# Patient Record
Sex: FEMALE | Race: WHITE | NOT HISPANIC OR LATINO | Employment: OTHER | URBAN - NONMETROPOLITAN AREA
[De-identification: names, ages, dates, MRNs, and addresses within clinical notes are randomized per-mention and may not be internally consistent; named-entity substitution may affect disease eponyms.]

---

## 2018-11-30 ENCOUNTER — TELEPHONE (OUTPATIENT)
Dept: OTOLARYNGOLOGY | Facility: CLINIC | Age: 53
End: 2018-11-30

## 2018-11-30 ENCOUNTER — OFFICE VISIT (OUTPATIENT)
Dept: OTOLARYNGOLOGY | Facility: CLINIC | Age: 53
End: 2018-11-30
Payer: COMMERCIAL

## 2018-11-30 VITALS — HEART RATE: 75 BPM | DIASTOLIC BLOOD PRESSURE: 70 MMHG | HEIGHT: 63 IN | SYSTOLIC BLOOD PRESSURE: 110 MMHG

## 2018-11-30 DIAGNOSIS — R09.81 NASAL CONGESTION: Primary | ICD-10-CM

## 2018-11-30 DIAGNOSIS — G43.009 MIGRAINE WITHOUT AURA AND WITHOUT STATUS MIGRAINOSUS, NOT INTRACTABLE: ICD-10-CM

## 2018-11-30 DIAGNOSIS — R09.82 POST-NASAL DRIP: ICD-10-CM

## 2018-11-30 DIAGNOSIS — R44.8 FACIAL PRESSURE: ICD-10-CM

## 2018-11-30 DIAGNOSIS — R42 DIZZINESS: ICD-10-CM

## 2018-11-30 PROCEDURE — 31231 NASAL ENDOSCOPY DX: CPT | Performed by: OTOLARYNGOLOGY

## 2018-11-30 PROCEDURE — 99243 OFF/OP CNSLTJ NEW/EST LOW 30: CPT | Performed by: OTOLARYNGOLOGY

## 2018-11-30 PROCEDURE — 69210 REMOVE IMPACTED EAR WAX UNI: CPT | Performed by: OTOLARYNGOLOGY

## 2018-11-30 RX ORDER — CLOBETASOL PROPIONATE 0.5 MG/G
CREAM TOPICAL
COMMUNITY
Start: 2016-12-16

## 2018-11-30 RX ORDER — IBUPROFEN 600 MG/1
TABLET ORAL
COMMUNITY
Start: 2016-12-16

## 2018-11-30 NOTE — LETTER
November 30, 2018     [address]    Patient: Jorge Alberto Bradshaw   YOB: 1965   Date of Visit: 11/30/2018       Dear Dr Terri Woodruff: Thank you for referring Jorge Alberto Bradshaw to me for evaluation  Below are my notes for this consultation  If you have questions, please do not hesitate to call me  I look forward to following your patient along with you  Sincerely,        Mauro Hernandez MD        CC: No Recipients  Mauro Hernandez MD  11/30/2018 12:22 PM  Sign at close encounter  Consultation - Otolaryngology - Head and Neck Surgery  Facial Plastic and Reconstructive Surgery  Jorge Alberto Bradshaw 52 y o  female MRN: [id number]  Encounter: [id number]        Assessment/Plan:  1  Nasal congestion  CT sinus wo contrast   2  Facial pressure  CT sinus wo contrast   3  Migraine without aura and without status migrainosus, not intractable     4  Dizziness     5  Post-nasal drip         Chronic sinusitis: We discussed the nature chronic sinusitis  She will continue the nasal steroids  As she has already had a 6 month trial of steroids and antibiotics, we will obtain a CT scan after therapy and have the patient return in 1 month for re-evaluation  Dizziness and migraines: Discussed possibility of vestibular migraines as a diagnosis for her dizziness with accompanying VALDEZ  Will discuss this further after CT scan and at follow up  History of Present Illness   Physician Requesting Consult: NURA Moreno  Reason for Consult / Principal Problem: facial pressure and dizziness  HPI: Jorge Alberto Bradshaw is a 52y o  year old female who presents with facial pressure, nasal congestion, history of migraines, and intermittent dizziness  She describes intermittent dizziness as closest to presyncope  She describes a lightheaded sensation when she will rise from a seated or lying position    She notes that frequently her symptoms are different than this with occasional slight spinning sensation  She notes that shortly after the spinning she will have a headache or she may have a headache 1st and then have a sensation of spinning  She notes majority of her dizziness symptoms are associated temporally with headaches  She uses ibuprofen to treat her headaches  No recent head or neck imaging  She has been given multiple rounds of antibiotics as well as nasal steroids  She recently finished a course of oral antibiotics and steroids without any resolution of her symptoms  She continues to use Nasacort nasal spray  No anterior rhinorrhea  She does note postnasal drip  Review of systems:  10 Point ROS was performed and negative except as above or otherwise noted in the medical record  Historical Information   No past medical history on file  No past surgical history on file  Social History   History   Alcohol use Not on file     History   Drug use: Unknown     History   Smoking Status    Not on file   Smokeless Tobacco    Not on file     Family History: No family history on file  No current outpatient prescriptions on file prior to visit  No current facility-administered medications on file prior to visit  No Known Allergies    Vitals:    11/30/18 1138   BP: 110/70   Pulse: 75       Physical Exam   Constitutional: Oriented to person, place, and time  Well-developed and well-nourished, no apparent distress, non-toxic appearance  Cooperative, able to hear and answer questions without difficulty  Voice: Normal voice quality  Head: Normocephalic, atraumatic  No scars, masses or lesions  Face: Symmetric, no edema, no sinus tenderness  Eyes: Vision grossly intact, extra-ocular movement intact  Ears: External ears normal  Bilateral cerumen impaction  After debridement following was noted: Right retained PE tube in the TM otherwise tympanic membranes intact with intact normal landmarks  No post-auricular erythema or tenderness    Nose: Septum intact, nares clear  Mucosa moist, turbinates well appearing  No crusting, polyps or discharge evident  Oral cavity: Dentition intact  Mucosa moist, lips without lesions or masses  Tongue mobile, floor of mouth soft and flat  Hard palate intact  No masses or lesions  Oropharynx: Uvula is midline, soft palate intact without lesion or mass  Oropharyngeal inlet without obstruction  Tonsils unremarkable  Posterior pharyngeal wall clear  No masses or lesions  Salivary glands:  Parotid glands and submandibular glands symmetric, no enlargement or tenderness  Neck: Normal laryngeal elevation with swallow  Trachea midline  No masses or lesions  No palpable adenopathy  Thyroid: Without tenderness or palpable nodules  Pulmonary/Chest: Normal effort and rate  No respiratory distress  No stertor or stridor  Musculoskeletal: Normal range of motion  Neurological: Cranial nerves 2-12 intact  Skin: Skin is warm and dry  Psychiatric: Normal mood and affect  Procedure: Nasal endoscopy    Indications: Evaluate posterior nasal cavity for active source of bleeding  Procedure in detail: After informed verbal consent was obtained the nose was anesthetized using 4% lidocaine and neosynephrine spray  After adequate time for anesthesia the scope was guided easily along the nasal cavity bilaterally  Bilateral middle meatus and sphenoethmoid recesses were evaluated  The scope was removed without difficulty and the patient tolerated the procedure well  Findings: No mucopurulence or polyposis bilaterally  Procedure: Cerumen debridement bilaterally    Indications: Cerumen impaction bilat    Procedure in detail: After informed verbal consent was obtained the ear was visualized using microscopy  Using cerumen loop, suction, and alligator forceps the cerumen was debrided and the findings below were seen  The patient tolerated the procedure well  FINDINGS:  Intact TM with intact normal landmarks   There is a right retained PE tube from 4 years ago per her report  Imaging Studies: None available    Lab Results: None available  Greater than 40 minutes were spent in consultation  More than 1/2 of that time was spent in counselling and coordination of care

## 2018-11-30 NOTE — TELEPHONE ENCOUNTER
CALLED PATIENT AT 7:45 AND 9:15 TO TELL HER TIME OF HER APPOINTMENT FOR TODAY BUT HER VOICE MAIL BOX IS FULL AND COULD NOT LEAVE A MESSAGE AND PATIENT DIDN'T ANSWER PHONE

## 2018-11-30 NOTE — PROGRESS NOTES
Review of Systems   Constitutional: Negative  HENT: Positive for postnasal drip  Eyes: Positive for discharge  Respiratory: Positive for cough  Cardiovascular: Negative  Gastrointestinal: Negative  Endocrine: Negative  Genitourinary: Positive for frequency  Musculoskeletal: Negative  Skin: Negative  Allergic/Immunologic: Negative  Neurological: Positive for dizziness, syncope and light-headedness  Hematological: Bruises/bleeds easily  Psychiatric/Behavioral: Negative

## 2018-11-30 NOTE — PROGRESS NOTES
Consultation - Otolaryngology - Head and Neck Surgery  Facial Plastic and Reconstructive Surgery  Florecita Bynum 52 y o  female MRN: [id number]  Encounter: [id number]        Assessment/Plan:  1  Nasal congestion  CT sinus wo contrast   2  Facial pressure  CT sinus wo contrast   3  Migraine without aura and without status migrainosus, not intractable     4  Dizziness     5  Post-nasal drip         Chronic sinusitis: We discussed the nature chronic sinusitis  She will continue the nasal steroids  As she has already had a 6 month trial of steroids and antibiotics, we will obtain a CT scan after therapy and have the patient return in 1 month for re-evaluation  Dizziness and migraines: Discussed possibility of vestibular migraines as a diagnosis for her dizziness with accompanying VALDEZ  Will discuss this further after CT scan and at follow up  History of Present Illness   Physician Requesting Consult: NURA Michelle  Reason for Consult / Principal Problem: facial pressure and dizziness  HPI: Florecita Bynum is a 52y o  year old female who presents with facial pressure, nasal congestion, history of migraines, and intermittent dizziness  She describes intermittent dizziness as closest to presyncope  She describes a lightheaded sensation when she will rise from a seated or lying position  She notes that frequently her symptoms are different than this with occasional slight spinning sensation  She notes that shortly after the spinning she will have a headache or she may have a headache 1st and then have a sensation of spinning  She notes majority of her dizziness symptoms are associated temporally with headaches  She uses ibuprofen to treat her headaches  No recent head or neck imaging  She has been given multiple rounds of antibiotics as well as nasal steroids  She recently finished a course of oral antibiotics and steroids without any resolution of her symptoms    She continues to use Nasacort nasal spray  No anterior rhinorrhea  She does note postnasal drip  Review of systems:  10 Point ROS was performed and negative except as above or otherwise noted in the medical record  Historical Information   No past medical history on file  No past surgical history on file  Social History   History   Alcohol use Not on file     History   Drug use: Unknown     History   Smoking Status    Not on file   Smokeless Tobacco    Not on file     Family History: No family history on file  No current outpatient prescriptions on file prior to visit  No current facility-administered medications on file prior to visit  No Known Allergies    Vitals:    11/30/18 1138   BP: 110/70   Pulse: 75       Physical Exam   Constitutional: Oriented to person, place, and time  Well-developed and well-nourished, no apparent distress, non-toxic appearance  Cooperative, able to hear and answer questions without difficulty  Voice: Normal voice quality  Head: Normocephalic, atraumatic  No scars, masses or lesions  Face: Symmetric, no edema, no sinus tenderness  Eyes: Vision grossly intact, extra-ocular movement intact  Ears: External ears normal  Bilateral cerumen impaction  After debridement following was noted: Right retained PE tube in the TM otherwise tympanic membranes intact with intact normal landmarks  No post-auricular erythema or tenderness  Nose: Septum intact, nares clear  Mucosa moist, turbinates well appearing  No crusting, polyps or discharge evident  Oral cavity: Dentition intact  Mucosa moist, lips without lesions or masses  Tongue mobile, floor of mouth soft and flat  Hard palate intact  No masses or lesions  Oropharynx: Uvula is midline, soft palate intact without lesion or mass  Oropharyngeal inlet without obstruction  Tonsils unremarkable  Posterior pharyngeal wall clear  No masses or lesions    Salivary glands:  Parotid glands and submandibular glands symmetric, no enlargement or tenderness  Neck: Normal laryngeal elevation with swallow  Trachea midline  No masses or lesions  No palpable adenopathy  Thyroid: Without tenderness or palpable nodules  Pulmonary/Chest: Normal effort and rate  No respiratory distress  No stertor or stridor  Musculoskeletal: Normal range of motion  Neurological: Cranial nerves 2-12 intact  Skin: Skin is warm and dry  Psychiatric: Normal mood and affect  Procedure: Nasal endoscopy    Indications: Evaluate posterior nasal cavity for active source of bleeding  Procedure in detail: After informed verbal consent was obtained the nose was anesthetized using 4% lidocaine and neosynephrine spray  After adequate time for anesthesia the scope was guided easily along the nasal cavity bilaterally  Bilateral middle meatus and sphenoethmoid recesses were evaluated  The scope was removed without difficulty and the patient tolerated the procedure well  Findings: No mucopurulence or polyposis bilaterally  Procedure: Cerumen debridement bilaterally    Indications: Cerumen impaction bilat    Procedure in detail: After informed verbal consent was obtained the ear was visualized using microscopy  Using cerumen loop, suction, and alligator forceps the cerumen was debrided and the findings below were seen  The patient tolerated the procedure well  FINDINGS:  Intact TM with intact normal landmarks  There is a right retained PE tube from 4 years ago per her report  Imaging Studies: None available    Lab Results: None available  Greater than 40 minutes were spent in consultation  More than 1/2 of that time was spent in counselling and coordination of care

## 2018-11-30 NOTE — LETTER
November 30, 2018     [address]    Patient: Iker Faye   YOB: 1965   Date of Visit: 11/30/2018       Dear Dr Nathalia Bonilla: Thank you for referring Iker Faey to me for evaluation  Below are my notes for this consultation  If you have questions, please do not hesitate to call me  I look forward to following your patient along with you  Sincerely,        Wolf Hall MD        CC: No Recipients  Wolf Hall MD  11/30/2018 12:21 PM  Sign at close encounter  Consultation - Otolaryngology - Head and Neck Surgery  Facial Plastic and Reconstructive Surgery  Iker Faye 52 y o  female MRN: [id number]  Encounter: [id number]        Assessment/Plan:  1  Nasal congestion  CT sinus wo contrast   2  Facial pressure  CT sinus wo contrast   3  Migraine without aura and without status migrainosus, not intractable     4  Dizziness     5  Post-nasal drip         Chronic sinusitis: We discussed the nature chronic sinusitis  She will continue the nasal steroids  As she has already had a 6 month trial of steroids and antibiotics, we will obtain a CT scan after therapy and have the patient return in 1 month for re-evaluation  Dizziness and migraines: Discussed possibility of vestibular migraines as a diagnosis for her dizziness with accompanying VALDEZ  Will discuss this further after CT scan and at follow up  History of Present Illness   Physician Requesting Consult: NURA Montero  Reason for Consult / Principal Problem: facial pressure and dizziness  HPI: Iker Faye is a 52y o  year old female who presents with facial pressure, nasal congestion, history of migraines, and intermittent dizziness  She describes intermittent dizziness as closest to presyncope  She describes a lightheaded sensation when she will rise from a seated or lying position    She notes that frequently her symptoms are different than this with occasional slight spinning sensation  She notes that shortly after the spinning she will have a headache or she may have a headache 1st and then have a sensation of spinning  She notes majority of her dizziness symptoms are associated temporally with headaches  She uses ibuprofen to treat her headaches  No recent head or neck imaging  She has been given multiple rounds of antibiotics as well as nasal steroids  She recently finished a course of oral antibiotics and steroids without any resolution of her symptoms  She continues to use Nasacort nasal spray  No anterior rhinorrhea  She does note postnasal drip  Review of systems:  10 Point ROS was performed and negative except as above or otherwise noted in the medical record  Historical Information   No past medical history on file  No past surgical history on file  Social History   History   Alcohol use Not on file     History   Drug use: Unknown     History   Smoking Status    Not on file   Smokeless Tobacco    Not on file     Family History: No family history on file  No current outpatient prescriptions on file prior to visit  No current facility-administered medications on file prior to visit  No Known Allergies    Vitals:    11/30/18 1138   BP: 110/70   Pulse: 75       Physical Exam   Constitutional: Oriented to person, place, and time  Well-developed and well-nourished, no apparent distress, non-toxic appearance  Cooperative, able to hear and answer questions without difficulty  Voice: Normal voice quality  Head: Normocephalic, atraumatic  No scars, masses or lesions  Face: Symmetric, no edema, no sinus tenderness  Eyes: Vision grossly intact, extra-ocular movement intact  Ears: External ears normal  Bilateral cerumen impaction  After debridement following was noted: Right retained PE tube in the TM otherwise tympanic membranes intact with intact normal landmarks  No post-auricular erythema or tenderness    Nose: Septum intact, nares clear  Mucosa moist, turbinates well appearing  No crusting, polyps or discharge evident  Oral cavity: Dentition intact  Mucosa moist, lips without lesions or masses  Tongue mobile, floor of mouth soft and flat  Hard palate intact  No masses or lesions  Oropharynx: Uvula is midline, soft palate intact without lesion or mass  Oropharyngeal inlet without obstruction  Tonsils unremarkable  Posterior pharyngeal wall clear  No masses or lesions  Salivary glands:  Parotid glands and submandibular glands symmetric, no enlargement or tenderness  Neck: Normal laryngeal elevation with swallow  Trachea midline  No masses or lesions  No palpable adenopathy  Thyroid: Without tenderness or palpable nodules  Pulmonary/Chest: Normal effort and rate  No respiratory distress  No stertor or stridor  Musculoskeletal: Normal range of motion  Neurological: Cranial nerves 2-12 intact  Skin: Skin is warm and dry  Psychiatric: Normal mood and affect  Procedure: Nasal endoscopy    Indications: Evaluate posterior nasal cavity for active source of bleeding  Procedure in detail: After informed verbal consent was obtained the nose was anesthetized using 4% lidocaine and neosynephrine spray  After adequate time for anesthesia the scope was guided easily along the nasal cavity bilaterally  Bilateral middle meatus and sphenoethmoid recesses were evaluated  The scope was removed without difficulty and the patient tolerated the procedure well  Findings: No mucopurulence or polyposis bilaterally  Imaging Studies: None available    Lab Results: None available  Greater than 40 minutes were spent in consultation  More than 1/2 of that time was spent in counselling and coordination of care

## 2018-12-12 ENCOUNTER — HOSPITAL ENCOUNTER (OUTPATIENT)
Dept: CT IMAGING | Facility: HOSPITAL | Age: 53
Discharge: HOME | End: 2018-12-12
Attending: OTOLARYNGOLOGY
Payer: COMMERCIAL

## 2018-12-12 DIAGNOSIS — R09.81 NASAL CONGESTION: ICD-10-CM

## 2018-12-12 DIAGNOSIS — R44.8 FACIAL PRESSURE: ICD-10-CM

## 2018-12-12 PROCEDURE — 70486 CT MAXILLOFACIAL W/O DYE: CPT

## 2018-12-14 ENCOUNTER — TELEPHONE (OUTPATIENT)
Dept: OTOLARYNGOLOGY | Facility: CLINIC | Age: 53
End: 2018-12-14

## 2018-12-14 NOTE — TELEPHONE ENCOUNTER
PATIENT CALLED RE ANTIBIOTIC SHE WAS SUPPOSED TO HAVE ORDERED BY DOCTOR  MESSAGE WAS SENT TO DOCTOR

## 2019-01-18 ENCOUNTER — OFFICE VISIT (OUTPATIENT)
Dept: OTOLARYNGOLOGY | Facility: CLINIC | Age: 54
End: 2019-01-18
Payer: COMMERCIAL

## 2019-01-18 VITALS — HEIGHT: 63 IN | DIASTOLIC BLOOD PRESSURE: 80 MMHG | SYSTOLIC BLOOD PRESSURE: 122 MMHG

## 2019-01-18 DIAGNOSIS — R44.8 FACIAL PRESSURE: ICD-10-CM

## 2019-01-18 DIAGNOSIS — J32.4 CHRONIC PANSINUSITIS: Primary | ICD-10-CM

## 2019-01-18 DIAGNOSIS — R09.81 NASAL CONGESTION: ICD-10-CM

## 2019-01-18 PROCEDURE — 31231 NASAL ENDOSCOPY DX: CPT | Performed by: OTOLARYNGOLOGY

## 2019-01-18 PROCEDURE — 99213 OFFICE O/P EST LOW 20 MIN: CPT | Performed by: OTOLARYNGOLOGY

## 2019-01-18 NOTE — LETTER
January 18, 2019     [address], [address]    Patient: Charla Dubin   YOB: 1965   Date of Visit: 1/18/2019       Dear Dr Jacey Ann: Thank you for referring Charla Dubin to me for evaluation  Below are my notes for this consultation  If you have questions, please do not hesitate to call me  I look forward to following your patient along with you  Sincerely,        Rylie Cabral MD        CC: No Recipients  Rylie Cabral MD  1/18/2019  4:05 PM  Sign at close encounter  Charla Dubin is a 52 y  o female who presents for re-evaluation of facial pain, nasal congestion, and facial pressure  She underwent CT scan which demonstrated bilateral chronic pansinusitis  She denies any nasal drainage  She notes hyposmia  She feels that the oral steroids, nasal steroids, and antibiotics did not substantially improve things  No past medical history on file  /80 (BP Location: Left arm, Patient Position: Sitting, Cuff Size: Large)   Ht 5' 3" (1 6 m)       Physical Exam   Constitutional: Oriented to person, place, and time  Well-developed and well-nourished, no apparent distress, non-toxic appearance  Cooperative, able to hear and answer questions without difficulty  Voice: Normal voice quality  Head: Normocephalic, atraumatic  No scars, masses or lesions  Face: Symmetric, no edema, no sinus tenderness  Eyes: Vision grossly intact, extra-ocular movement intact  Right Ear: External ear normal   Auditory canal clear  No post-auricular erythema or tenderness  Left Ear: External ear normal   Auditory canal clear  No post-auricular erythema or tenderness  Nose: Septum midline, nares clear  Mucosa moist, turbinates well appearing  No crusting, polyps or discharge evident  Oral cavity: Dentition intact  Mucosa moist, lips normal   Tongue mobile, floor of mouth normal   Hard palate unremarkable  No masses or lesions     Oropharynx: Uvula is midline, soft palate normal   Unremarkable oropharyngeal inlet  Tonsils unremarkable  Posterior pharyngeal wall clear  No masses or lesions  Salivary glands:  Parotid glands and submandibular glands symmetric, no enlargement or tenderness  Neck: Normal laryngeal elevation with swallow  Trachea midline  No masses or lesions  No palpable adenopathy  Thyroid: normal in size, unremarkable without tenderness or palpable nodules  Pulmonary/Chest: Normal effort and rate  No respiratory distress  Musculoskeletal: Normal range of motion  Neurological: Cranial nerves 2-12 intact  Skin: Skin is warm and dry  Psychiatric: Normal mood and affect  Procedure: Nasal endoscopy    Indications: Evaluate posterior nasal cavity for active source of bleeding  Procedure in detail: After informed verbal consent was obtained the nose was anesthetized using 4% lidocaine and neosynephrine spray  After adequate time for anesthesia the scope was guided easily along the nasal cavity bilaterally  Bilateral middle meatus and sphenoethmoid recesses were evaluated  The scope was removed without difficulty and the patient tolerated the procedure well  Findings:  Bilaterally no significant mucoid purulence or polyposis  Slight left septal deviation  Not completely obstructive  A/P: Chronic sinusitis:  We discussed risks, benefits, and alternatives to functional endoscopic sinus surgery  We discussed risks including bleeding, infection, and scarring  We discussed risks of hyposmia, injury to the eyes, vision loss, diplopia, injury to the brain, leakage of cerebrospinal fluid, and infection of the brain, among others  He elected to move forward with sinus surgery  We discussed possible need for septoplasty and turbinate reduction for access  We discussed that he will need to irrigate his sinuses afterwards  We discussed follow up 1 week after surgery  He will speak with our office to schedule surgery

## 2019-01-18 NOTE — PROGRESS NOTES
Review of Systems   Constitutional: Negative  HENT: Positive for facial swelling, sinus pain and sinus pressure  Eyes: Negative  Respiratory: Negative  Cardiovascular: Negative  Gastrointestinal: Negative  Endocrine: Negative  Genitourinary: Negative  Musculoskeletal: Negative  Skin: Negative  Allergic/Immunologic: Negative  Neurological: Negative  Hematological: Negative  Psychiatric/Behavioral: Negative

## 2019-01-18 NOTE — PROGRESS NOTES
Amanda Olson is a 52 y  o female who presents for re-evaluation of facial pain, nasal congestion, and facial pressure  She underwent CT scan which demonstrated bilateral chronic pansinusitis  She denies any nasal drainage  She notes hyposmia  She feels that the oral steroids, nasal steroids, and antibiotics did not substantially improve things  No past medical history on file  /80 (BP Location: Left arm, Patient Position: Sitting, Cuff Size: Large)   Ht 5' 3" (1 6 m)       Physical Exam   Constitutional: Oriented to person, place, and time  Well-developed and well-nourished, no apparent distress, non-toxic appearance  Cooperative, able to hear and answer questions without difficulty  Voice: Normal voice quality  Head: Normocephalic, atraumatic  No scars, masses or lesions  Face: Symmetric, no edema, no sinus tenderness  Eyes: Vision grossly intact, extra-ocular movement intact  Right Ear: External ear normal   Auditory canal clear  No post-auricular erythema or tenderness  Left Ear: External ear normal   Auditory canal clear  No post-auricular erythema or tenderness  Nose: Septum midline, nares clear  Mucosa moist, turbinates well appearing  No crusting, polyps or discharge evident  Oral cavity: Dentition intact  Mucosa moist, lips normal   Tongue mobile, floor of mouth normal   Hard palate unremarkable  No masses or lesions  Oropharynx: Uvula is midline, soft palate normal   Unremarkable oropharyngeal inlet  Tonsils unremarkable  Posterior pharyngeal wall clear  No masses or lesions  Salivary glands:  Parotid glands and submandibular glands symmetric, no enlargement or tenderness  Neck: Normal laryngeal elevation with swallow  Trachea midline  No masses or lesions  No palpable adenopathy  Thyroid: normal in size, unremarkable without tenderness or palpable nodules  Pulmonary/Chest: Normal effort and rate  No respiratory distress     Musculoskeletal: Normal range of motion  Neurological: Cranial nerves 2-12 intact  Skin: Skin is warm and dry  Psychiatric: Normal mood and affect  Procedure: Nasal endoscopy    Indications: Evaluate posterior nasal cavity for active source of bleeding  Procedure in detail: After informed verbal consent was obtained the nose was anesthetized using 4% lidocaine and neosynephrine spray  After adequate time for anesthesia the scope was guided easily along the nasal cavity bilaterally  Bilateral middle meatus and sphenoethmoid recesses were evaluated  The scope was removed without difficulty and the patient tolerated the procedure well  Findings:  Bilaterally no significant mucoid purulence or polyposis  Slight left septal deviation  Not completely obstructive  A/P: Chronic sinusitis:  We discussed risks, benefits, and alternatives to functional endoscopic sinus surgery  We discussed risks including bleeding, infection, and scarring  We discussed risks of hyposmia, injury to the eyes, vision loss, diplopia, injury to the brain, leakage of cerebrospinal fluid, and infection of the brain, among others  He elected to move forward with sinus surgery  We discussed possible need for septoplasty and turbinate reduction for access  We discussed that he will need to irrigate his sinuses afterwards  We discussed follow up 1 week after surgery  He will speak with our office to schedule surgery

## 2019-02-21 ENCOUNTER — TRANSCRIBE ORDERS (OUTPATIENT)
Dept: ADMINISTRATIVE | Facility: HOSPITAL | Age: 54
End: 2019-02-21

## 2019-02-21 DIAGNOSIS — R42 DIZZINESS: Primary | ICD-10-CM

## 2019-02-21 DIAGNOSIS — R41.3 MEMORY LOSS: ICD-10-CM

## 2019-02-28 ENCOUNTER — HOSPITAL ENCOUNTER (OUTPATIENT)
Dept: MRI IMAGING | Facility: HOSPITAL | Age: 54
Discharge: HOME | End: 2019-02-28
Payer: COMMERCIAL

## 2019-02-28 DIAGNOSIS — R41.3 MEMORY LOSS: ICD-10-CM

## 2019-02-28 DIAGNOSIS — R42 DIZZINESS: ICD-10-CM

## 2019-02-28 PROCEDURE — 70551 MRI BRAIN STEM W/O DYE: CPT

## 2019-10-09 ENCOUNTER — TELEPHONE (OUTPATIENT)
Dept: OTOLARYNGOLOGY | Facility: CLINIC | Age: 54
End: 2019-10-09

## 2019-10-09 NOTE — TELEPHONE ENCOUNTER
TRIED CALLING MERRY NOW FOR THE 2ND TIME AND MAIL BOX IS FULL    MERRY CALLED BACK AND SHE IS SCHEDULED FOR 10/18/19

## 2019-10-18 ENCOUNTER — OFFICE VISIT (OUTPATIENT)
Dept: OTOLARYNGOLOGY | Facility: CLINIC | Age: 54
End: 2019-10-18
Payer: COMMERCIAL

## 2019-10-18 VITALS — HEIGHT: 63 IN | HEART RATE: 84 BPM | SYSTOLIC BLOOD PRESSURE: 130 MMHG | DIASTOLIC BLOOD PRESSURE: 70 MMHG

## 2019-10-18 DIAGNOSIS — R09.81 NASAL CONGESTION: ICD-10-CM

## 2019-10-18 DIAGNOSIS — J32.4 CHRONIC PANSINUSITIS: Primary | ICD-10-CM

## 2019-10-18 DIAGNOSIS — R44.8 FACIAL PRESSURE: ICD-10-CM

## 2019-10-18 DIAGNOSIS — H61.23 HEARING LOSS OF BOTH EARS DUE TO CERUMEN IMPACTION: ICD-10-CM

## 2019-10-18 PROCEDURE — 31231 NASAL ENDOSCOPY DX: CPT | Performed by: OTOLARYNGOLOGY

## 2019-10-18 PROCEDURE — 99213 OFFICE O/P EST LOW 20 MIN: CPT | Performed by: OTOLARYNGOLOGY

## 2019-10-18 PROCEDURE — 69210 REMOVE IMPACTED EAR WAX UNI: CPT | Performed by: OTOLARYNGOLOGY

## 2019-10-18 RX ORDER — PREDNISONE 10 MG/1
TABLET ORAL
Qty: 30 TABLET | Refills: 0 | Status: ON HOLD | OUTPATIENT
Start: 2019-10-18 | End: 2019-12-12

## 2019-10-18 RX ORDER — AMOXICILLIN AND CLAVULANATE POTASSIUM 875; 125 MG/1; MG/1
1 TABLET, FILM COATED ORAL EVERY 12 HOURS SCHEDULED
Qty: 14 TABLET | Refills: 0 | Status: SHIPPED | OUTPATIENT
Start: 2019-10-18 | End: 2019-10-25

## 2019-10-18 NOTE — PROGRESS NOTES
Review of Systems   Constitutional: Negative  HENT: Positive for congestion, postnasal drip, rhinorrhea and sinus pressure  Eyes: Negative  Respiratory: Negative  Cardiovascular: Negative  Gastrointestinal: Negative  Endocrine: Negative  Genitourinary: Negative  Musculoskeletal: Negative  Skin: Negative  Allergic/Immunologic: Negative  Neurological: Negative  Hematological: Negative  Psychiatric/Behavioral: Negative

## 2019-10-18 NOTE — PROGRESS NOTES
Eleni Rizzo is a 62 y  o female who presents for re-evaluation of chronic sinusitis  She the was lost to follow-up after her last visit in January as she had significant concerns about surgery  She denies any improvement in her symptoms  She feels that things have actually worsened recently  She notes worsened postnasal drip when she lies down  She has had more problems with dizziness  She notes worsened facial pressure and decreased sense of smell  She had a CT scan performed in December of 2018 which demonstrated chronic pansinusitis  No previous sinus surgery  History reviewed  No pertinent past medical history  /70 (BP Location: Left arm, Patient Position: Sitting, Cuff Size: Large)   Pulse 84   Ht 5' 3" (1 6 m)       Physical Exam   Constitutional: Oriented to person, place, and time  Well-developed and well-nourished, no apparent distress, non-toxic appearance  Cooperative, able to hear and answer questions without difficulty  Voice: Normal voice quality  Head: Normocephalic, atraumatic  No scars, masses or lesions  Face: Symmetric, no edema, no sinus tenderness  Eyes: Vision grossly intact, extra-ocular movement intact  Ears: External ear normal   Bilateral complete cerumen impaction and retained right PE tube in the wax  Bilateral tympanic membranes are intact with intact normal landmarks  No post-auricular erythema or tenderness  Nose: Septum right deviated, nares clear  Mucosa moist, turbinates well appearing  No crusting, polyps or discharge evident  Oral cavity: Dentition intact  Mucosa moist, lips normal   Tongue mobile, floor of mouth normal   Hard palate unremarkable  No masses or lesions  Oropharynx: Uvula is midline, soft palate normal   Unremarkable oropharyngeal inlet  Tonsils unremarkable  Posterior pharyngeal wall clear  No masses or lesions  Salivary glands:  Parotid glands and submandibular glands symmetric, no enlargement or tenderness    Neck: Ventricular Rate : 97  Atrial Rate : 97  P-R Interval : 188  QRS Duration : 110  Q-T Interval : 386  QTC Calculation(Bezet) : 490  P Axis : 39  R Axis : -20  T Axis : 10  Diagnosis : Normal sinus rhythm  Moderate voltage criteria for LVH, may be normal variant  Prolonged QT  ****Abnormal ECG****  When compared with ECG of 26-JAN-2019 01:01,  No significant change was found  Confirmed by GUCCI SY, CONCHIS ([id number]) on 1/27/2019 9:59:58 AM   Normal laryngeal elevation with swallow  Trachea midline  No masses or lesions  No palpable adenopathy  Thyroid: normal in size, unremarkable without tenderness or palpable nodules  Pulmonary/Chest: Normal effort and rate  No respiratory distress  Musculoskeletal: Normal range of motion  Neurological: Cranial nerves 2-12 intact  Skin: Skin is warm and dry  Psychiatric: Normal mood and affect  Procedure:  Nasal endoscopy    Indications:  Evaluation for sinusitis    Procedure in detail:  After informed verbal consent was obtained from the patient bilateral nares were anesthetized with 1% lidocaine and oxymetazoline spray  After adequate time for anesthesia patient's bilateral nasal cavities, middle meatus, and sphenoethmoid recesses were evaluated with the following findings  Patient tolerated the procedure well without complications  Findings:   No significant mucoid purulence or polyposis seen in bilateral nasal cavities  Right septal deviation    Procedure: Cerumen debridement bilateral    Indications: Cerumen impaction bilateral    Procedure in detail: After informed verbal consent was obtained the ear was visualized using microscopy  Using cerumen loop, suction, and alligator forceps the cerumen was debrided and the findings below were seen  The patient tolerated the procedure well  FINDINGS:  Right retained PE tube removed along with the cerumen  A/P: Chronic sinusitis:  We discussed the nature chronic sinusitis  We discussed starting 7 day course of augmentin, 12 day steroid taper, and nasal steroids  We discussed that the use of appropriate medical therapy can substantially improve symptoms, but neither medications or surgery cure the disease and needs ongoing treatment  We discussed the risks of antibiotics, including, but not limited to, incomplete therapy, C diff colitis, medication her reactions, medication allergy, and others    We discussed the risks benefits and alternatives to oral prednisone therapy, including, but not limited to, GI distress in worsening of ulcers, elevation of blood sugar and diabetic complications, psychiatric complications, and avascular necrosis of have  We will obtain a CT scan after therapy and have the patient return in 1 month for re-evaluation  We discussed risks, benefits, and alternatives to functional endoscopic sinus surgery  We discussed risks including bleeding, infection, and scarring  We discussed risks of hyposmia, injury to the eyes, vision loss, diplopia, injury to the brain, leakage of cerebrospinal fluid, and infection of the brain, among others  She elected to move forward with sinus surgery  We discussed possible need for septoplasty and turbinate reduction for access  We discussed that she will need to irrigate her sinuses afterwards  We discussed follow up 1 week after surgery  She will speak with our office to schedule surgery  Will plan for bilateral maxillary antrosotomy, total ethmoidectomy, frontal sinusotomy with balloon sinuplasty, and sphenoidotomy  Will plan for turb reduction and possible septoplasty if necessary  CT scan will be performed as above preop

## 2019-10-30 ENCOUNTER — HOSPITAL ENCOUNTER (OUTPATIENT)
Dept: CT IMAGING | Facility: HOSPITAL | Age: 54
Discharge: HOME | End: 2019-10-30
Attending: OTOLARYNGOLOGY
Payer: COMMERCIAL

## 2019-10-30 DIAGNOSIS — R44.8 FACIAL PRESSURE: ICD-10-CM

## 2019-10-30 DIAGNOSIS — R09.81 NASAL CONGESTION: ICD-10-CM

## 2019-10-30 DIAGNOSIS — J32.4 CHRONIC PANSINUSITIS: ICD-10-CM

## 2019-10-30 PROCEDURE — 70486 CT MAXILLOFACIAL W/O DYE: CPT

## 2019-12-11 ENCOUNTER — ANESTHESIA EVENT (OUTPATIENT)
Dept: PERIOP | Facility: HOSPITAL | Age: 54
End: 2019-12-11
Payer: COMMERCIAL

## 2019-12-12 ENCOUNTER — HOSPITAL ENCOUNTER (OUTPATIENT)
Facility: HOSPITAL | Age: 54
Setting detail: OUTPATIENT SURGERY
Discharge: HOME | End: 2019-12-12
Attending: OTOLARYNGOLOGY | Admitting: OTOLARYNGOLOGY
Payer: COMMERCIAL

## 2019-12-12 ENCOUNTER — ANESTHESIA (OUTPATIENT)
Dept: PERIOP | Facility: HOSPITAL | Age: 54
End: 2019-12-12
Payer: COMMERCIAL

## 2019-12-12 VITALS
RESPIRATION RATE: 16 BRPM | BODY MASS INDEX: 51.91 KG/M2 | OXYGEN SATURATION: 94 % | DIASTOLIC BLOOD PRESSURE: 71 MMHG | SYSTOLIC BLOOD PRESSURE: 152 MMHG | HEART RATE: 76 BPM | WEIGHT: 293 LBS | TEMPERATURE: 97.4 F | HEIGHT: 63 IN

## 2019-12-12 DIAGNOSIS — J32.4 CHRONIC PANSINUSITIS: ICD-10-CM

## 2019-12-12 DIAGNOSIS — R09.81 NASAL CONGESTION: Primary | ICD-10-CM

## 2019-12-12 DIAGNOSIS — R44.8 FACIAL PRESSURE: ICD-10-CM

## 2019-12-12 PROCEDURE — 61782 SCAN PROC CRANIAL EXTRA: CPT | Performed by: OTOLARYNGOLOGY

## 2019-12-12 PROCEDURE — 88312 SPECIAL STAINS GROUP 1: CPT | Performed by: PATHOLOGY

## 2019-12-12 PROCEDURE — 31256 EXPLORATION MAXILLARY SINUS: CPT | Performed by: OTOLARYNGOLOGY

## 2019-12-12 PROCEDURE — 88304 TISSUE EXAM BY PATHOLOGIST: CPT | Performed by: PATHOLOGY

## 2019-12-12 PROCEDURE — 31287 NASAL/SINUS ENDOSCOPY SURG: CPT | Performed by: OTOLARYNGOLOGY

## 2019-12-12 PROCEDURE — 30140 RESECT INFERIOR TURBINATE: CPT | Performed by: OTOLARYNGOLOGY

## 2019-12-12 PROCEDURE — 31253 NSL/SINS NDSC TOTAL: CPT | Performed by: OTOLARYNGOLOGY

## 2019-12-12 RX ORDER — FENTANYL CITRATE 50 UG/ML
INJECTION, SOLUTION INTRAMUSCULAR; INTRAVENOUS AS NEEDED
Status: DISCONTINUED | OUTPATIENT
Start: 2019-12-12 | End: 2019-12-12 | Stop reason: SURG

## 2019-12-12 RX ORDER — ROCURONIUM BROMIDE 10 MG/ML
INJECTION, SOLUTION INTRAVENOUS AS NEEDED
Status: DISCONTINUED | OUTPATIENT
Start: 2019-12-12 | End: 2019-12-12 | Stop reason: SURG

## 2019-12-12 RX ORDER — LIDOCAINE HYDROCHLORIDE AND EPINEPHRINE 10; 10 MG/ML; UG/ML
INJECTION, SOLUTION INFILTRATION; PERINEURAL AS NEEDED
Status: DISCONTINUED | OUTPATIENT
Start: 2019-12-12 | End: 2019-12-12 | Stop reason: HOSPADM

## 2019-12-12 RX ORDER — ONDANSETRON 2 MG/ML
4 INJECTION INTRAMUSCULAR; INTRAVENOUS ONCE AS NEEDED
Status: DISCONTINUED | OUTPATIENT
Start: 2019-12-12 | End: 2019-12-12 | Stop reason: HOSPADM

## 2019-12-12 RX ORDER — LIDOCAINE HYDROCHLORIDE 10 MG/ML
INJECTION, SOLUTION EPIDURAL; INFILTRATION; INTRACAUDAL; PERINEURAL AS NEEDED
Status: DISCONTINUED | OUTPATIENT
Start: 2019-12-12 | End: 2019-12-12 | Stop reason: SURG

## 2019-12-12 RX ORDER — HYDROMORPHONE HCL/PF 1 MG/ML
0.5 SYRINGE (ML) INJECTION
Status: DISCONTINUED | OUTPATIENT
Start: 2019-12-12 | End: 2019-12-12 | Stop reason: HOSPADM

## 2019-12-12 RX ORDER — PROPOFOL 10 MG/ML
INJECTION, EMULSION INTRAVENOUS CONTINUOUS PRN
Status: DISCONTINUED | OUTPATIENT
Start: 2019-12-12 | End: 2019-12-12 | Stop reason: SURG

## 2019-12-12 RX ORDER — OXYCODONE HYDROCHLORIDE 5 MG/1
5 TABLET ORAL EVERY 4 HOURS PRN
Qty: 10 TABLET | Refills: 0 | Status: SHIPPED | OUTPATIENT
Start: 2019-12-12 | End: 2019-12-22

## 2019-12-12 RX ORDER — DEXAMETHASONE SODIUM PHOSPHATE 10 MG/ML
INJECTION, SOLUTION INTRAMUSCULAR; INTRAVENOUS AS NEEDED
Status: DISCONTINUED | OUTPATIENT
Start: 2019-12-12 | End: 2019-12-12 | Stop reason: SURG

## 2019-12-12 RX ORDER — FENTANYL CITRATE/PF 50 MCG/ML
50 SYRINGE (ML) INJECTION
Status: DISCONTINUED | OUTPATIENT
Start: 2019-12-12 | End: 2019-12-12 | Stop reason: HOSPADM

## 2019-12-12 RX ORDER — ONDANSETRON 2 MG/ML
INJECTION INTRAMUSCULAR; INTRAVENOUS AS NEEDED
Status: DISCONTINUED | OUTPATIENT
Start: 2019-12-12 | End: 2019-12-12 | Stop reason: SURG

## 2019-12-12 RX ORDER — GLYCOPYRROLATE 0.2 MG/ML
INJECTION INTRAMUSCULAR; INTRAVENOUS AS NEEDED
Status: DISCONTINUED | OUTPATIENT
Start: 2019-12-12 | End: 2019-12-12 | Stop reason: SURG

## 2019-12-12 RX ORDER — MIDAZOLAM HYDROCHLORIDE 2 MG/2ML
INJECTION, SOLUTION INTRAMUSCULAR; INTRAVENOUS AS NEEDED
Status: DISCONTINUED | OUTPATIENT
Start: 2019-12-12 | End: 2019-12-12 | Stop reason: SURG

## 2019-12-12 RX ORDER — ACETAMINOPHEN 160 MG/5ML
650 SUSPENSION, ORAL (FINAL DOSE FORM) ORAL ONCE
Status: COMPLETED | OUTPATIENT
Start: 2019-12-12 | End: 2019-12-12

## 2019-12-12 RX ORDER — SODIUM CHLORIDE 9 MG/ML
125 INJECTION, SOLUTION INTRAVENOUS CONTINUOUS
Status: DISCONTINUED | OUTPATIENT
Start: 2019-12-12 | End: 2019-12-12 | Stop reason: HOSPADM

## 2019-12-12 RX ORDER — OXYCODONE HCL 5 MG/5 ML
5 SOLUTION, ORAL ORAL EVERY 4 HOURS PRN
Status: DISCONTINUED | OUTPATIENT
Start: 2019-12-12 | End: 2019-12-12 | Stop reason: HOSPADM

## 2019-12-12 RX ORDER — PROPOFOL 10 MG/ML
INJECTION, EMULSION INTRAVENOUS AS NEEDED
Status: DISCONTINUED | OUTPATIENT
Start: 2019-12-12 | End: 2019-12-12 | Stop reason: SURG

## 2019-12-12 RX ADMIN — SODIUM CHLORIDE 125 ML/HR: 0.9 INJECTION, SOLUTION INTRAVENOUS at 14:20

## 2019-12-12 RX ADMIN — LIDOCAINE HYDROCHLORIDE 60 MG: 10 INJECTION, SOLUTION EPIDURAL; INFILTRATION; INTRACAUDAL; PERINEURAL at 15:59

## 2019-12-12 RX ADMIN — ONDANSETRON 8 MG: 2 INJECTION INTRAMUSCULAR; INTRAVENOUS at 16:15

## 2019-12-12 RX ADMIN — PROPOFOL 200 MG: 10 INJECTION, EMULSION INTRAVENOUS at 15:59

## 2019-12-12 RX ADMIN — FENTANYL CITRATE 50 MCG: 50 INJECTION, SOLUTION INTRAMUSCULAR; INTRAVENOUS at 17:05

## 2019-12-12 RX ADMIN — DEXAMETHASONE SODIUM PHOSPHATE 8 MG: 10 INJECTION, SOLUTION INTRAMUSCULAR; INTRAVENOUS at 16:15

## 2019-12-12 RX ADMIN — PROPOFOL 150 MCG/KG/MIN: 10 INJECTION, EMULSION INTRAVENOUS at 16:03

## 2019-12-12 RX ADMIN — GLYCOPYRROLATE 0.4 MG: 0.2 INJECTION INTRAMUSCULAR; INTRAVENOUS at 16:47

## 2019-12-12 RX ADMIN — ROCURONIUM BROMIDE 50 MG: 50 INJECTION, SOLUTION INTRAVENOUS at 15:59

## 2019-12-12 RX ADMIN — REMIFENTANIL HYDROCHLORIDE 0.07 MCG/KG/MIN: 1 INJECTION, POWDER, LYOPHILIZED, FOR SOLUTION INTRAVENOUS at 16:03

## 2019-12-12 RX ADMIN — ACETAMINOPHEN 650 MG: 650 SUSPENSION ORAL at 18:34

## 2019-12-12 RX ADMIN — FENTANYL CITRATE 100 MCG: 50 INJECTION, SOLUTION INTRAMUSCULAR; INTRAVENOUS at 15:59

## 2019-12-12 RX ADMIN — MIDAZOLAM 2 MG: 1 INJECTION INTRAMUSCULAR; INTRAVENOUS at 15:49

## 2019-12-12 RX ADMIN — SODIUM CHLORIDE: 0.9 INJECTION, SOLUTION INTRAVENOUS at 16:53

## 2019-12-12 NOTE — ANESTHESIA POSTPROCEDURE EVALUATION
Post-Op Assessment Note    CV Status:  Stable  Pain Score: 2    Pain management: adequate     Mental Status:  Alert and awake   Hydration Status:  Euvolemic   PONV Controlled:  Controlled   Airway Patency:  Patent   Post Op Vitals Reviewed: Yes      Staff: Anesthesiologist           /77 (12/12/19 1702)    Temp     Pulse 78 (12/12/19 1702)   Resp 15 (12/12/19 1702)    SpO2 98 % (12/12/19 1702)

## 2019-12-12 NOTE — ANESTHESIA PREPROCEDURE EVALUATION
Review of Systems/Medical History  Patient summary reviewed        Cardiovascular  Negative cardio ROS    Pulmonary  Negative pulmonary ROS Smoker ex-smoker  , Sleep apnea ,   Comment: Stopped  Using CPAP  Machine      GI/Hepatic  Negative GI/hepatic ROS          Negative  ROS        Endo/Other  Negative endo/other ROS History of thyroid disease , hypothyroidism,   Comment: Not taking hormone therapy x 2 yrs   No apparent S/S  Noted on exam  Obesity  super morbid obesity   GYN  Negative gynecology ROS          Hematology  Negative hematology ROS      Musculoskeletal  Negative musculoskeletal ROS   Arthritis     Neurology  Negative neurology ROS   Headaches,    Psychology   Negative psychology ROS              Physical Exam    Airway    Mallampati score: III  TM Distance: >3 FB  Neck ROM: full     Dental       Cardiovascular  Comment: Negative ROS, Rhythm: regular, Rate: normal, Cardiovascular exam normal    Pulmonary  Pulmonary exam normal Breath sounds clear to auscultation,     Other Findings        Anesthesia Plan  ASA Score- 3     Anesthesia Type- general with ASA Monitors  Additional Monitors:   Airway Plan: ETT  Plan Factors-    Induction- intravenous  Postoperative Plan-     Informed Consent- Anesthetic plan and risks discussed with patient

## 2019-12-12 NOTE — OP NOTE
OPERATIVE REPORT  PATIENT NAME: Jenny Sharp    :  1965  MRN: [id number]  Pt Location: [facility] OR ROOM 08    SURGERY DATE: 2019    Surgeon(s) and Role:     Kenia Reynolds MD - Primary    Preop Diagnosis:  Chronic pansinusitis [J32 4]  Nasal congestion [R09 81]  Facial pressure [R68 89]    Post-Op Diagnosis Codes:     * Chronic pansinusitis [J32 4]     * Nasal congestion [R09 81]     * Facial pressure [R68 89]    Procedure(s) (LRB):  IMAGED GUIDED FESS, MAX  ANTROSTOMY, TOTAL ETHMOIDECTOMY, FRONTAL SPHENOIDOTOMY, SINUSOTOMY (N/A)  TURBINATE SUBMUCOUS RESECTION (N/A)    Specimen(s):  ID Type Source Tests Collected by Time Destination   1 : bilateral sinus content Tissue Nasal/Sinus Polyps TISSUE EXAM Jimmy Knox MD 2019  4:34 PM        Estimated Blood Loss:   Minimal    Drains:  * No LDAs found *    Anesthesia Type:   General    Operative Indications:  Chronic pansinusitis [J32 4]  Nasal congestion [R09 81]  Facial pressure [R68 89]      Operative Findings:  Diminutive right frontal sinus  Polypoid mucosa in bilateral ethmoid sinuses    Complications:   None    PREOPERATIVE DIAGNOSES:  1  Chronic maxillary sinusitis  2  Chronic ethmoid sinusitis  3  Chronic sphenoid sinusitis  4  Chronic frontal sinusitis  5  Bilateral turbinate hypertrophy    POSTOPERATIVE DIAGNOSES:  Same    PROCEDURES:  1  Bilateral maxillary antrostomies  2  Bilateral total ethmoidectomies  3  Bilateral sphenoidotomies  4  Bilateral frontal sinusotomies  5  Computer guided intraoperative surgical navigation  6  Bilateral submucous resection of the turbinates    SURGEON:  Yaritza Gillette MD    ASSISTANTS: none    ANESTHESIA:  General endotracheal    ESTIMATED BLOOD LOSS:  < 50 cc    FLUIDS:  Crystalloid    COMPLICATIONS:  None  INDICATIONS FOR PROCEDURE:  This is a 62y o  year old female whom I've seen previously in consultation regarding severe nasal obstruction    Risks and benefits of the above procedures were discussed at length, including but not limited to bleeding, infection, external or internal nasal scars, septal perforation, hyposmia, injury to the eyes, or skull base, vision changes, CSF leakage, or brain injury/infection, among others  Patient understands and consents to proceed  PROCEDURE IN DETAIL:  After informed written consent was obtained from the patient, she was brought to the operating room, laid in the supine position, and general endotracheal anesthesia was administered  Time out was performed  Images were brought up and sides were confirmed  The nose was decongested with oxymetazoline pledges  The nose was anesthetized with 10 ml of 1% lidocaine with 1:100,000 epinephrine  The face was prepped and draped in standard fashion  After adequate time for anesthesia the procedure was performed begun  COMPUTER-GUIDED INTRAOPERATIVE SURGICAL NAVIGATION: At this point, the Fusion Image Guidance System was brought near the surgical field, and the patient's facial landmarks were co-registered with the pre-existing image-guided CT scan for accurate, less than 1 mm intraoperative surgical navigation of the bilateral paranasal sinuses  This was used throughout the patient's case to confirm intraoperative localization  BILATERAL MAXILLARY ANTROSTOMIES: First the left, and then the right, nasal cavities were dissected  The middle turbinate was gently medialized, to allow visualization of the uncinate process  This was first identified using a ball probe, and then a back-biter was used to make superior and inferior flaps along the uncinate process, and into the maxillary sinus  These flaps were removed using hand instrumentation, as well as a microdebrider  A wide maxillary antrostomy was required, given the amount of edematous and floppy uncinate tissue that was found intraoperatively   The antrostomy was taken from the area of the maxillary sinus ostium to define this structure, posteriorly to the posterior wall of the maxillary sinus  On the right side, a similar procedure was performed, in which the uncinate process was identified, and dissected away using back-biters, as well as the microdebrider instrument  A wide antrostomy was also felt to be obtained, and on this side, a fair amount of bleeding was noted upon uncinectomy, probably indicative of prior recurrent infections  An edematous mound of tissue was noted along the uncinate process and along the orbit that was safely and carefully dissected away  BILATERAL TOTAL ETHMOIDECTOMIES: First on the left side, the ethmoid bulla was noted, and a J curette was used to fracture this structure forward, and a 45-degree Blakesley forceps was used to remove this from the nasal cavity  Thereafter, the basal lamella was penetrated using the J curette, into the posterior ethmoid sinus, and in so doing, edematous tissue was noted along the bony fragments of the ethmoid sinus and along the basal lamella, quite suggestive of recurrent infections and chronic rhinosinusitis in this unfortunate patient  Basal lamella was safely resected, leaving a small attachment site of the middle turbinate to the lateral nasal wall  This safely exposed the superior turbinate, and the inferior 1/3 of this was resected to allow proper visualization of the sphenoid ostia on the left side  On the right side, a similar approach was employed in which the ethmoid bulla was safely fractured anteriorly and removed from the nasal cavity, and posterior ethmoid sinus was dissected by penetrating the basal lamella and removing diseased bony partitions and mucosa between the maxillary sinus and the sphenoid sinus  During subsequent aspects of the dissection after sphenoid localization below, a 30-degree reverse telescope was used to dissect along the skull base of the anterior and posterior ethmoids   Diseased and edematous tissue was especially noted along the ethmoid skull base and along the medial aspect of the orbit, and these partitions were removed with direct visualization and sharp through-cutting hand instrumentation  BILATERAL SPHENOIDOTOMIES: Once the sphenoidostomy could be identified, image guidance was used to confirm intraoperative placement  A J curette was used to open and define the sphenoidotomy, which was then further widened using a straight mushroom punch, as well as a Hajek punch  The skull base was identified posteriorly and superiorly as our landmark for further ethmoid surgery  On the right side, a similar approach was used to explore the right sphenoid sinus, and widen it to identify the skull base this landmark appropriately  The lateral recesses of the sphenoid were adequately dissected, in order to perform a safe sphenoidotomy in this patient  BILATERAL FRONTAL SINUSOTOMIES: Finally, after achieving the dissection of the ethmoid sinuses and sphenoid sinuses, we came from a posterior and anterior direction towards the frontal recesses bilaterally  On the left side, a fair amount of edematous tissue was noted between the middle turbinate and the frontal recess  First, the polypoid tissue along the middle turbinate was smoothly removed using a microdebrider  Thereafter, the axilla of the middle turbinate was resected using a frontal Kerrison  Then frontal cells were noted along the area of the frontal recess, and these were sharply dissected using frontal sinus hand instrumentation, as well as a 70-degree reverse telescope  This gave appropriate visualization of the frontal sinus ostium and the frontal sinus was readily cannulated with the posterior table was readily identified  A crafted frontal sinus stent was then placed into the left frontal sinus for future postoperative management       On the right side, a similar procedure was performed, in which edematous tissue was noted along the frontal sinuses and frontal recess, and this was carefully resected using both a curved microdebrider, a 70-degree reverse telescope, as well as hand instrumentation  The axilla of the middle turbinate was removed, to allow access to the frontal recess, and edematous tissue and frontal sinus cells with diseased bone and tissue were resected in a graduated manner  This allowed acces to, and entry into, the frontal sinus, and the posterior table was readily identified on the right side  With this performed, the frontal sinusotomy was complete, and a crafted frontal sinus stent was placed into the right frontal sinus for future management  Submucous resection of the inferior turbinates was performed bilaterally as follows: Using the microdebrider the left inferior turbinate head was entered  Submucous and bony tissue was removed in several passes until an improved nasal airway was seen  Similar procedure was performed on the right  Mucosa was preserved  The turbinate was outfractured using a [place] elevator  The patient was returned to the care of Anesthesia, extubated without difficulty, and taken to the recovery area in stable condition  All instruments and sponge counts were correct at the end of the procedure  Tin Maddox MD, was present for and performed all key elements of the procedure           I was present for the entire procedure    Patient Disposition:  PACU     SIGNATURE: Leila Mireles MD  DATE: December 12, 2019  TIME: 4:53 PM

## 2019-12-12 NOTE — DISCHARGE INSTRUCTIONS
Functional Endoscopic Sinus Surgery for Rhinosinusitis   Office [phone]  Cell [phone]      WHAT YOU SHOULD KNOW:   Functional endoscopic sinus surgery (FESS) removes tissue that blocks your sinus openings  AFTER YOU LEAVE:   Medicines:   · Medicines  can help decrease pain or prevent bacterial infections  Ask your healthcare provider how to take prescription pain medicine safely  · Take your medicine as directed  Call your healthcare provider if you think your medicine is not helping or if you have side effects  Tell him if you are allergic to any medicine  Keep a list of the medicines, vitamins, and herbs you take  Include the amounts, and when and why you take them  Bring the list or the pill bottles to follow-up visits  Carry your medicine list with you in case of an emergency  Follow up with your healthcare provider as directed:  Write down your questions so you remember to ask them during your visits  Home care:   · No nose blowing until instructed  · Drink plenty of liquids  Ask your healthcare provider how much liquid to drink each day and which liquids are best for you  Limit the amount of caffeine you drink  · Rinse your nose with salt water  This may help loosen your thick mucous so that it comes out more easily when you blow your nose  Start using the [place] [facility] Sinus rinse the night of surgery and continue to use twice daily until otherwise instructed  · Twice weekly, clean the sinus rinse bottle with hot, soapy water and microwave while wet for 60 seconds  · Use a cool-mist vaporizer or humidifier  to add moisture to the air  This helps thin your nasal discharge and prevent colds  Wash the humidifier each day with soap and warm water to keep it free of germs  · Wash your hands frequently  Wash your hands after you come into contact with a person who has a cold   Germ-killing hand lotion or gel may be used to clean your hands when there is no water available  · Sleep with the head of bed elevated to reduce swelling within the nose        You may use ibuprofen (Motrin, Advil) and acetaminophen (Tyelnol) for pain control in addition to any prescribed pain medications  Nasal packing:  Ask your healthcare provider how long the nasal packing will stay inside your nose  If it needs to be removed and replaced at home, ask your healthcare provider how to properly do this  Contact your healthcare provider if:   · You have a fever  · You have chills, a cough, or feel weak and achy  · You have bruises or swelling around your nose or eyes  · Any vision changes    · You have nausea or vomiting  · Blood soaks through your nasal packing  · Your skin is itchy, swollen, or has a rash  · You have questions or concerns about your condition or care  Seek care immediately or call 911 if:   · You have a stiff neck or eye pain, especially when you look directly at light  · You have a severe headache that does not go away even after you take pain medicine  · You have clear fluid coming from your nose  · You have pus or a foul-smelling odor coming from your nose  · You have trouble breathing, seeing, talking, or thinking clearly  · Your face is getting numb  · Your symptoms come back or become worse

## 2020-07-21 ENCOUNTER — APPOINTMENT (RX ONLY)
Dept: URBAN - NONMETROPOLITAN AREA CLINIC 4 | Facility: CLINIC | Age: 55
Setting detail: DERMATOLOGY
End: 2020-07-21

## 2020-07-21 DIAGNOSIS — L72.8 OTHER FOLLICULAR CYSTS OF THE SKIN AND SUBCUTANEOUS TISSUE: ICD-10-CM

## 2020-07-21 PROCEDURE — ? COUNSELING

## 2020-07-21 PROCEDURE — ? DEFER

## 2020-07-21 PROCEDURE — 99202 OFFICE O/P NEW SF 15 MIN: CPT

## 2020-07-21 ASSESSMENT — LOCATION ZONE DERM
LOCATION ZONE: TRUNK
LOCATION ZONE: TRUNK

## 2020-07-21 ASSESSMENT — LOCATION SIMPLE DESCRIPTION DERM
LOCATION SIMPLE: UPPER BACK
LOCATION SIMPLE: RIGHT UPPER BACK

## 2020-07-21 ASSESSMENT — LOCATION DETAILED DESCRIPTION DERM
LOCATION DETAILED: RIGHT SUPERIOR MEDIAL UPPER BACK
LOCATION DETAILED: SUPERIOR THORACIC SPINE

## 2020-07-21 NOTE — PROCEDURE: DEFER
Introduction Text (Please End With A Colon): The following procedure was deferred: sclerotherapy
Scheduling Instructions (Optional): on a Thursday
Detail Level: Zone

## 2020-08-13 ENCOUNTER — APPOINTMENT (RX ONLY)
Dept: URBAN - NONMETROPOLITAN AREA CLINIC 4 | Facility: CLINIC | Age: 55
Setting detail: DERMATOLOGY
End: 2020-08-13

## 2020-08-13 DIAGNOSIS — L72.8 OTHER FOLLICULAR CYSTS OF THE SKIN AND SUBCUTANEOUS TISSUE: ICD-10-CM

## 2020-08-13 PROCEDURE — ? EXCISION

## 2020-08-13 PROCEDURE — 11403 EXC TR-EXT B9+MARG 2.1-3CM: CPT

## 2020-08-13 ASSESSMENT — LOCATION SIMPLE DESCRIPTION DERM: LOCATION SIMPLE: UPPER BACK

## 2020-08-13 ASSESSMENT — LOCATION ZONE DERM: LOCATION ZONE: TRUNK

## 2020-08-13 ASSESSMENT — LOCATION DETAILED DESCRIPTION DERM: LOCATION DETAILED: SUPERIOR THORACIC SPINE

## 2020-08-27 ENCOUNTER — APPOINTMENT (RX ONLY)
Dept: URBAN - NONMETROPOLITAN AREA CLINIC 4 | Facility: CLINIC | Age: 55
Setting detail: DERMATOLOGY
End: 2020-08-27

## 2020-08-27 DIAGNOSIS — Z48.817 ENCOUNTER FOR SURGICAL AFTERCARE FOLLOWING SURGERY ON THE SKIN AND SUBCUTANEOUS TISSUE: ICD-10-CM

## 2020-08-27 DIAGNOSIS — L08.9 LOCAL INFECTION OF THE SKIN AND SUBCUTANEOUS TISSUE, UNSPECIFIED: ICD-10-CM

## 2020-08-27 PROCEDURE — ? COUNSELING

## 2020-08-27 PROCEDURE — 99024 POSTOP FOLLOW-UP VISIT: CPT

## 2020-08-27 PROCEDURE — ? PRESCRIPTION

## 2020-08-27 PROCEDURE — 99212 OFFICE O/P EST SF 10 MIN: CPT

## 2020-08-27 PROCEDURE — ? SUTURE REMOVAL (GLOBAL PERIOD)

## 2020-08-27 RX ORDER — CEPHALEXIN 500 MG/1
500MG TABLET ORAL BID
Qty: 20 | Refills: 0 | Status: ERX | COMMUNITY
Start: 2020-08-27

## 2020-08-27 RX ADMIN — CEPHALEXIN 500MG: 500 TABLET ORAL at 00:00

## 2020-08-27 ASSESSMENT — LOCATION ZONE DERM: LOCATION ZONE: TRUNK

## 2020-08-27 ASSESSMENT — LOCATION SIMPLE DESCRIPTION DERM: LOCATION SIMPLE: UPPER BACK

## 2020-08-27 ASSESSMENT — LOCATION DETAILED DESCRIPTION DERM: LOCATION DETAILED: SUPERIOR THORACIC SPINE

## 2020-08-27 NOTE — PROCEDURE: SUTURE REMOVAL (GLOBAL PERIOD)
Detail Level: Detailed
Add 69524 Cpt? (Important Note: In 2017 The Use Of 44531 Is Being Tracked By Cms To Determine Future Global Period Reimbursement For Global Periods): yes

## 2023-10-05 NOTE — PROCEDURE: EXCISION
Orders Placed This Encounter   Procedures    Home Sleep Study     Standing Status:   Future     Standing Expiration Date:   10/4/2024         Bilobed Flap Text: The defect edges were debeveled with a #15 scalpel blade.  Given the location of the defect and the proximity to free margins a bilobe flap was deemed most appropriate.  Using a sterile surgical marker, an appropriate bilobe flap drawn around the defect.    The area thus outlined was incised deep to adipose tissue with a #15 scalpel blade.  The skin margins were undermined to an appropriate distance in all directions utilizing iris scissors.

## (undated) DEVICE — SHEATH 1912000 5PK 4MM/0DEG STORZ XOMED: Brand: ENDO-SCRUB®

## (undated) DEVICE — 2000CC GUARDIAN II: Brand: GUARDIAN

## (undated) DEVICE — INTENDED FOR TISSUE SEPARATION, AND OTHER PROCEDURES THAT REQUIRE A SHARP SURGICAL BLADE TO PUNCTURE OR CUT.: Brand: BARD-PARKER ® CARBON RIB-BACK BLADES

## (undated) DEVICE — NEEDLE 25G X 1 1/2

## (undated) DEVICE — TUBING SUCTION 5MM X 12 FT

## (undated) DEVICE — BLADE 1882040 5PK INFERIOR TURB 2MM

## (undated) DEVICE — SCD SEQUENTIAL COMPRESSION COMFORT SLEEVE MEDIUM KNEE LENGTH: Brand: KENDALL SCD

## (undated) DEVICE — SHEATH 1912010 5PK 4MM/30DEG STORZ XOMED: Brand: ENDO-SCRUB®

## (undated) DEVICE — SPLINT 1524050 5PK PAIR DOYLE II AIRWAY: Brand: DOYLE II ™

## (undated) DEVICE — NEURO PATTIES 1/2 X 3

## (undated) DEVICE — ANTI-FOG SOLUTION WITH FOAM PAD: Brand: DEVON

## (undated) DEVICE — SYRINGE 3ML LL

## (undated) DEVICE — PATIENT TRACKER 9734887XOM NON-INVASIVE

## (undated) DEVICE — MAYO STAND COVER: Brand: CONVERTORS

## (undated) DEVICE — ELECTRODE NEEDLE MOD E-Z CLEAN 2.75IN 7CM -0013M

## (undated) DEVICE — PENCIL ELECTROSURG E-Z CLEAN -0035H

## (undated) DEVICE — NEEDLE 18 G X 1 1/2

## (undated) DEVICE — SKIN MARKER DUAL TIP WITH RULER CAP, FLEXIBLE RULER AND LABELS: Brand: DEVON

## (undated) DEVICE — SYRINGE 10ML LL CONTROL TOP

## (undated) DEVICE — GLOVE INDICATOR PI UNDERGLOVE SZ 7.5 BLUE

## (undated) DEVICE — STERILE NASAL PACK: Brand: CARDINAL HEALTH

## (undated) DEVICE — GLOVE SRG BIOGEL 7.5

## (undated) DEVICE — SPLINT INTRANASAL 0.6 X 2 IN POSISEP X

## (undated) DEVICE — INSTRUMENT TRACKER 9733533XOM ENT 1PK